# Patient Record
Sex: FEMALE | Race: WHITE | URBAN - NONMETROPOLITAN AREA
[De-identification: names, ages, dates, MRNs, and addresses within clinical notes are randomized per-mention and may not be internally consistent; named-entity substitution may affect disease eponyms.]

---

## 2017-10-16 ENCOUNTER — TELEPHONE (OUTPATIENT)
Dept: FAMILY MEDICINE CLINIC | Age: 27
End: 2017-10-16

## 2017-10-16 NOTE — TELEPHONE ENCOUNTER
Was there more to this conversation? I saw her last on 10/14/16 and changed her OCP to Lo ogestral due to spotting between periods and have not seen her since. She had moved to [facility] and was suppose to be establishing with new PCP there. She does not have appmt with me in [facility] now either and I see no other messages.

## 2017-10-17 NOTE — TELEPHONE ENCOUNTER
Spoke to pt and she is currently here now. She is a teacher. She states that she is having trouble with her BC and having spotting in between cycles. I told her that she will need to make an appt to be seen since it has been awhile since she was last seen. Pt verbalized understanding. She is going to check her schedule and then call back to make an appt for this.